# Patient Record
Sex: MALE | Race: WHITE | NOT HISPANIC OR LATINO | Employment: UNEMPLOYED | URBAN - NONMETROPOLITAN AREA
[De-identification: names, ages, dates, MRNs, and addresses within clinical notes are randomized per-mention and may not be internally consistent; named-entity substitution may affect disease eponyms.]

---
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
## 2022-01-25 DIAGNOSIS — F98.8 ATTENTION DEFICIT DISORDER, UNSPECIFIED HYPERACTIVITY PRESENCE: ICD-10-CM

## 2022-01-25 NOTE — TELEPHONE ENCOUNTER
Caller: Daron Le    Relationship: Self    Best call back number: [phone]     Requested Prescriptions:   Requested Prescriptions     Pending Prescriptions Disp Refills   • lisdexamfetamine (Vyvanse) 40 MG capsule 30 capsule 0     Sig: Take 1 capsule by mouth Every Morning        Pharmacy where request should be sent: [facility] - [place] - [address] [facility] - [phone]  - [phone] FX     Additional details provided by patient: OUT OF MEDICATION        Luma Ray Rep   01/25/22 13:55 CST

## 2022-01-25 NOTE — TELEPHONE ENCOUNTER
Rx Refill Note  Requested Prescriptions     Pending Prescriptions Disp Refills   • lisdexamfetamine (Vyvanse) 40 MG capsule 30 capsule 0     Sig: Take 1 capsule by mouth Every Morning      Last office visit with prescribing clinician: 10/18/2021      Next office visit with prescribing clinician: 7/18/2022            Yeni Turner MA  01/25/22, 15:05 CST

## 2022-01-31 ENCOUNTER — TELEMEDICINE (OUTPATIENT)
Dept: FAMILY MEDICINE CLINIC | Facility: CLINIC | Age: 22
End: 2022-01-31

## 2022-01-31 DIAGNOSIS — F98.8 ATTENTION DEFICIT DISORDER, UNSPECIFIED HYPERACTIVITY PRESENCE: ICD-10-CM

## 2022-01-31 PROCEDURE — 99213 OFFICE O/P EST LOW 20 MIN: CPT | Performed by: FAMILY MEDICINE

## 2022-03-15 DIAGNOSIS — F98.8 ATTENTION DEFICIT DISORDER, UNSPECIFIED HYPERACTIVITY PRESENCE: ICD-10-CM

## 2022-03-15 NOTE — TELEPHONE ENCOUNTER
=  Caller: Daron Le    Relationship: Self    Best call back number: [phone] (H)     Requested Prescriptions:   Requested Prescriptions     Pending Prescriptions Disp Refills   • lisdexamfetamine (Vyvanse) 50 MG capsule 30 capsule 0     Sig: Take 1 capsule by mouth Every Morning        Pharmacy where request should be sent: [facility] - [place] - [address] [phone] [facility] [phone] FX     Additional details provided by patient:completely out     Does the patient have less than a 3 day supply:  [x] Yes  [] No    Luma Bonilla Rep   03/15/22 13:49 CDT

## 2022-03-16 NOTE — TELEPHONE ENCOUNTER
Rx Refill Note  Requested Prescriptions     Pending Prescriptions Disp Refills   • lisdexamfetamine (Vyvanse) 50 MG capsule 30 capsule 0     Sig: Take 1 capsule by mouth Every Morning      Last office visit with prescribing clinician: 10/18/2021      Next office visit with prescribing clinician: 7/18/2022   Last Filled 01/31/2022 #30 no rf          {TIP  Is Refill Pharmacy correct?:23}  Dee Dee Tolbert MA  03/16/22, 12:22 CDT

## 2022-05-03 DIAGNOSIS — F98.8 ATTENTION DEFICIT DISORDER, UNSPECIFIED HYPERACTIVITY PRESENCE: ICD-10-CM

## 2022-05-03 NOTE — TELEPHONE ENCOUNTER
Rx Refill Note  Requested Prescriptions     Pending Prescriptions Disp Refills   • lisdexamfetamine (Vyvanse) 50 MG capsule 30 capsule 0     Sig: Take 1 capsule by mouth Every Morning      Last office visit with prescribing clinician: 10/18/2021      Next office visit with prescribing clinician: 7/18/2022    Last refill: 3/17/2022    Danika Arguello MA  05/03/22, 14:34 CDT

## 2022-05-03 NOTE — TELEPHONE ENCOUNTER
Caller: Daron Le    Relationship: Self    Best call back number: [phone]    Requested Prescriptions:   Requested Prescriptions     Pending Prescriptions Disp Refills   • lisdexamfetamine (Vyvanse) 50 MG capsule 30 capsule 0     Sig: Take 1 capsule by mouth Every Morning        Pharmacy where request should be sent: [facility] - [place] - [address] [facility] - [phone]  - [phone] FX         Does the patient have less than a 3 day supply:  [x] Yes  [] No    Luma HERNANDEZ Rep   05/03/22 14:13 CDT

## 2022-07-06 DIAGNOSIS — F98.8 ATTENTION DEFICIT DISORDER, UNSPECIFIED HYPERACTIVITY PRESENCE: ICD-10-CM

## 2022-07-06 NOTE — TELEPHONE ENCOUNTER
Caller: Daron Le    Relationship: Self    Best call back number: [phone]  Requested Prescriptions:   Requested Prescriptions     Pending Prescriptions Disp Refills   • lisdexamfetamine (Vyvanse) 50 MG capsule 30 capsule 0     Sig: Take 1 capsule by mouth Every Morning        Pharmacy where request should be sent: [facility] #[id number] - [address] AT [facility] [address] - [phone] [facility] [phone] FX         Does the patient have less than a 3 day supply:  [x] Yes  [] No    Luma Interiano Rep   07/06/22 11:21 CDT

## 2022-07-06 NOTE — TELEPHONE ENCOUNTER
Rx Refill Note  Requested Prescriptions     Pending Prescriptions Disp Refills   • lisdexamfetamine (Vyvanse) 50 MG capsule 30 capsule 0     Sig: Take 1 capsule by mouth Every Morning      Last office visit with prescribing clinician: 10/18/2021      Next office visit with prescribing clinician: 7/28/2022            Yeni Turner MA  07/06/22, 11:40 CDT

## 2022-07-11 NOTE — TELEPHONE ENCOUNTER
I called and spoke to the pt. He states that the office never called to schedule him for an appointment in April. Pt states that he has already waited 2 weeks and is now going to have to wait longer due to his appointment being 07/28/2022. He would like for the Vyvanse to be called in now and he will still come to his scheduled appointment.

## 2022-07-12 ENCOUNTER — TELEMEDICINE (OUTPATIENT)
Dept: FAMILY MEDICINE CLINIC | Facility: CLINIC | Age: 22
End: 2022-07-12

## 2022-07-12 DIAGNOSIS — F90.9 ADULT ADHD: Primary | ICD-10-CM

## 2022-07-12 DIAGNOSIS — G47.00 INSOMNIA, UNSPECIFIED TYPE: ICD-10-CM

## 2022-07-12 PROCEDURE — 99214 OFFICE O/P EST MOD 30 MIN: CPT | Performed by: FAMILY MEDICINE

## 2022-07-12 RX ORDER — HYDROXYZINE PAMOATE 25 MG/1
25 CAPSULE ORAL NIGHTLY PRN
Qty: 30 CAPSULE | Refills: 0 | Status: SHIPPED | OUTPATIENT
Start: 2022-07-12 | End: 2022-09-27 | Stop reason: SDUPTHER

## 2022-07-12 RX ORDER — DEXTROAMPHETAMINE SACCHARATE, AMPHETAMINE ASPARTATE MONOHYDRATE, DEXTROAMPHETAMINE SULFATE AND AMPHETAMINE SULFATE 3.75; 3.75; 3.75; 3.75 MG/1; MG/1; MG/1; MG/1
15 CAPSULE, EXTENDED RELEASE ORAL EVERY MORNING
Qty: 30 CAPSULE | Refills: 0 | Status: SHIPPED | OUTPATIENT
Start: 2022-07-12 | End: 2022-08-24 | Stop reason: SDUPTHER

## 2022-07-12 NOTE — TELEPHONE ENCOUNTER
Called and notified pt that he needed a follow up appointment every 3 months. I informed the pt we could schedule his appointment for a closer date and he could also do a video visit if it was easier for him. Pt voiced an understanding, transferred him to the front to make an appointment.

## 2022-07-12 NOTE — PROGRESS NOTES
Subjective cc: ADHD   Daron Le is a 21 y.o. male who presents for follow up on ADHD medication via mychart visit.     Patient is currently taking Vyvanse 50 MG daily. He will take it daily for work and school. He does feel like it is helpful. He does notice a tic at times. He does feel like the dose is good.     He has never tried any other ADHD medication but is interested.     Pt does report that he has issues sleeping at times. He has held his ADHD medications and still has sleep issues. He has tried melatonin without improvement. He has trouble falling asleep and staying asleep. Most of it is falling asleep.     History of Present Illness     The following portions of the patient's history were reviewed and updated as appropriate: allergies, current medications, past family history, past medical history, past social history, past surgical history and problem list.        Review of Systems    Objective   There were no vitals taken for this visit.  Physical Exam  Constitutional:       General: He is not in acute distress.     Appearance: Normal appearance. He is not toxic-appearing.   Neurological:      Mental Status: He is alert and oriented to person, place, and time.   Psychiatric:         Mood and Affect: Mood normal.         Behavior: Behavior normal.         Thought Content: Thought content normal.         Judgment: Judgment normal.         Assessment & Plan   Problems Addressed this Visit        Mental Health    Adult ADHD - Primary    Relevant Medications    amphetamine-dextroamphetamine XR (Adderall XR) 15 MG 24 hr capsule    hydrOXYzine pamoate (Vistaril) 25 MG capsule      Other Visit Diagnoses     Insomnia, unspecified type        Relevant Medications    hydrOXYzine pamoate (Vistaril) 25 MG capsule      Diagnoses       Codes Comments    Adult ADHD    -  Primary ICD-10-CM: F90.9  ICD-9-CM: 314.01     Insomnia, unspecified type     ICD-10-CM: G47.00  ICD-9-CM: 780.52         PLAN:     #1 ADHD: chronic,  uncontrolled, will trial alternative medication, advised on risks/benefits of medication, return if not improvign after 1 month, aicha reviewed, controlled contract in chart, UDS UTD, call with any concerns     #2 insomnia: new, will trial vistaril, advised on risks/beenfits, return if not improving     20 minutes           This document has been electronically signed by Cortney Andres MD on July 18, 2022 16:52 CDT

## 2022-08-24 DIAGNOSIS — F90.9 ADULT ADHD: ICD-10-CM

## 2022-08-24 RX ORDER — DEXTROAMPHETAMINE SACCHARATE, AMPHETAMINE ASPARTATE MONOHYDRATE, DEXTROAMPHETAMINE SULFATE AND AMPHETAMINE SULFATE 3.75; 3.75; 3.75; 3.75 MG/1; MG/1; MG/1; MG/1
15 CAPSULE, EXTENDED RELEASE ORAL EVERY MORNING
Qty: 30 CAPSULE | Refills: 0 | Status: SHIPPED | OUTPATIENT
Start: 2022-08-24 | End: 2022-09-27 | Stop reason: SDUPTHER

## 2022-08-24 NOTE — TELEPHONE ENCOUNTER
Caller: Rey Daron    Relationship: Self    Best call back number: [phone]    Requested Prescriptions: amphetamine-dextroamphetamine XR (Adderall XR) 15 MG 24 hr capsule PATIENT IS REQUESTING A MG INCREASE  Requested Prescriptions      No prescriptions requested or ordered in this encounter        Pharmacy where request should be sent:     [facility] #[id number] - [address] AT [facility] [address] - [phone]  - [phone]   [phone]     Additional details provided by patient: TOTALLY OUT    Does the patient have less than a 3 day supply:  [x] Yes  [] No    Luma Grove Rep   08/24/22 10:44 CDT

## 2022-08-24 NOTE — TELEPHONE ENCOUNTER
Rx Refill Note  Requested Prescriptions     Pending Prescriptions Disp Refills   • amphetamine-dextroamphetamine XR (Adderall XR) 15 MG 24 hr capsule 30 capsule 0     Sig: Take 1 capsule by mouth Every Morning      Last office visit with prescribing clinician: 10/18/2021      Next office visit with prescribing clinician: Visit date not found            Candida Parikh LPN  08/24/22, 16:08 CDT

## 2022-09-27 DIAGNOSIS — G47.00 INSOMNIA, UNSPECIFIED TYPE: ICD-10-CM

## 2022-09-27 DIAGNOSIS — F90.9 ADULT ADHD: ICD-10-CM

## 2022-09-27 NOTE — TELEPHONE ENCOUNTER
Caller: Daron Le    Relationship: Self    Best call back number: [phone]      Requested Prescriptions     Pending Prescriptions Disp Refills   • amphetamine-dextroamphetamine XR (Adderall XR) 15 MG 24 hr capsule 30 capsule 0     Sig: Take 1 capsule by mouth Every Morning   • hydrOXYzine pamoate (Vistaril) 25 MG capsule 30 capsule 0     Sig: Take 1 capsule by mouth At Night As Needed (insomnia).        Pharmacy where request should be sent: [facility] #[id number] [address] AT [facility] [address] - [phone] [facility] [phone] FX     Additional details provided by patient:    PATIENT IS ASKING THAT HIS ADDERALL BE INCREASED    Does the patient have less than a 3 day supply:  [x] Yes  [] No    Luma Watkins Rep   09/27/22 15:42 CDT

## 2022-09-28 NOTE — TELEPHONE ENCOUNTER
Rx Refill Note  Requested Prescriptions     Pending Prescriptions Disp Refills   • amphetamine-dextroamphetamine XR (Adderall XR) 15 MG 24 hr capsule 30 capsule 0     Sig: Take 1 capsule by mouth Every Morning   • hydrOXYzine pamoate (Vistaril) 25 MG capsule 30 capsule 0     Sig: Take 1 capsule by mouth At Night As Needed (insomnia).      Last office visit with prescribing clinician: 07/12/2022  Next office visit with prescribing clinician: Visit date not found            Yeni Turner MA  09/28/22, 12:09 CDT

## 2022-09-29 RX ORDER — DEXTROAMPHETAMINE SACCHARATE, AMPHETAMINE ASPARTATE MONOHYDRATE, DEXTROAMPHETAMINE SULFATE AND AMPHETAMINE SULFATE 3.75; 3.75; 3.75; 3.75 MG/1; MG/1; MG/1; MG/1
15 CAPSULE, EXTENDED RELEASE ORAL EVERY MORNING
Qty: 30 CAPSULE | Refills: 0 | Status: SHIPPED | OUTPATIENT
Start: 2022-09-29

## 2022-09-29 RX ORDER — HYDROXYZINE PAMOATE 25 MG/1
25 CAPSULE ORAL NIGHTLY PRN
Qty: 30 CAPSULE | Refills: 0 | Status: SHIPPED | OUTPATIENT
Start: 2022-09-29

## 2024-02-08 ENCOUNTER — OFFICE VISIT (OUTPATIENT)
Dept: FAMILY MEDICINE CLINIC | Facility: CLINIC | Age: 24
End: 2024-02-08
Payer: COMMERCIAL

## 2024-02-08 VITALS
RESPIRATION RATE: 18 BRPM | TEMPERATURE: 98.7 F | HEART RATE: 72 BPM | BODY MASS INDEX: 30.71 KG/M2 | WEIGHT: 247 LBS | SYSTOLIC BLOOD PRESSURE: 131 MMHG | HEIGHT: 75 IN | OXYGEN SATURATION: 98 % | DIASTOLIC BLOOD PRESSURE: 80 MMHG

## 2024-02-08 DIAGNOSIS — F98.8 ADD (ATTENTION DEFICIT DISORDER) WITHOUT HYPERACTIVITY: Primary | ICD-10-CM

## 2024-02-08 PROCEDURE — 99213 OFFICE O/P EST LOW 20 MIN: CPT

## 2024-02-08 RX ORDER — ATOMOXETINE 40 MG/1
40 CAPSULE ORAL DAILY
Qty: 7 CAPSULE | Refills: 0 | Status: SHIPPED | OUTPATIENT
Start: 2024-02-08 | End: 2024-02-15

## 2024-02-08 RX ORDER — ATOMOXETINE 80 MG/1
80 CAPSULE ORAL DAILY
Qty: 30 CAPSULE | Refills: 0 | Status: SHIPPED | OUTPATIENT
Start: 2024-02-15

## 2024-02-08 NOTE — PROGRESS NOTES
Answers submitted by the patient for this visit:  Office Visit on 2024  3:00 PM with Jon Mann (Submitted on 2024)  Please describe your symptoms.: Just getting prescription  Have you had these symptoms before?: No  How long have you been having these symptoms?: 1-4 days  Please list any medications you are currently taking for this condition.: Vyvanse  Please describe any probable cause for these symptoms. : Na    Chief Complaint  Med Refill (Wants to start back on Vyvanse for school)    Subjective        Daron Le presents to [facility]  History of Present Illness  Patient is a 23-year-old male presenting with history of ADD. In the past he had taken Vyvanse with relief. Reports he is a senior in college and is having difficulties with school. With sitting in class he catches his mind wondering. This is also occurring with reading where he will be reading but not remember anything with his mind thinking of other things. Tries to refocus but is not able to stay focused on his work.     Past Medical History:   Diagnosis Date    ADHD (attention deficit hyperactivity disorder)      History reviewed. No pertinent surgical history.  Social History     Socioeconomic History    Marital status: Single   Tobacco Use    Smoking status: Former     Packs/day: 0.25     Years: 2.00     Additional pack years: 0.00     Total pack years: 0.50     Types: Cigarettes     Quit date:      Years since quittin.1     Passive exposure: Past    Smokeless tobacco: Current   Vaping Use    Vaping Use: Every day   Substance and Sexual Activity    Alcohol use: Yes    Sexual activity: Defer     History reviewed. No pertinent family history.    Review of Systems  Review of systems is negative unless otherwise specified in HPI.    Objective   Vital Signs:  /80 (BP Location: Left arm, Patient Position: Sitting, Cuff Size: Adult)   Pulse 72   Temp 98.7 °F (37.1 °C) (Infrared)   Resp  "18   Ht 190.5 cm (75\")   Wt 112 kg (247 lb)   SpO2 98%   BMI 30.87 kg/m²   Estimated body mass index is 30.87 kg/m² as calculated from the following:    Height as of this encounter: 190.5 cm (75\").    Weight as of this encounter: 112 kg (247 lb).       PHQ-9 Depression Screening  Little interest or pleasure in doing things? 0-->not at all   Feeling down, depressed, or hopeless? 0-->not at all   Trouble falling or staying asleep, or sleeping too much?     Feeling tired or having little energy?     Poor appetite or overeating?     Feeling bad about yourself - or that you are a failure or have let yourself or your family down?     Trouble concentrating on things, such as reading the newspaper or watching television?     Moving or speaking so slowly that other people could have noticed? Or the opposite - being so fidgety or restless that you have been moving around a lot more than usual?     Thoughts that you would be better off dead, or of hurting yourself in some way?     PHQ-9 Total Score 0   If you checked off any problems, how difficult have these problems made it for you to do your work, take care of things at home, or get along with other people?          Physical Exam  Vitals and nursing note reviewed.   Constitutional:       Appearance: Normal appearance.   HENT:      Head: Normocephalic.      Right Ear: Tympanic membrane normal.      Nose: Nose normal.      Mouth/Throat:      Mouth: Mucous membranes are moist.      Pharynx: Oropharynx is clear.   Eyes:      Pupils: Pupils are equal, round, and reactive to light.   Cardiovascular:      Rate and Rhythm: Normal rate.   Pulmonary:      Effort: Pulmonary effort is normal. No respiratory distress.   Abdominal:      Palpations: Abdomen is soft.   Musculoskeletal:         General: Normal range of motion.      Cervical back: Normal range of motion.   Skin:     General: Skin is warm and dry.      Capillary Refill: Capillary refill takes less than 2 seconds. "   Neurological:      General: No focal deficit present.      Mental Status: He is alert.   Psychiatric:         Mood and Affect: Mood normal.          Result Review :               Assessment and Plan   Diagnoses and all orders for this visit:    1. ADD (attention deficit disorder) without hyperactivity (Primary)  -     atomoxetine (Strattera) 40 MG capsule; Take 1 capsule by mouth Daily for 7 days.  Dispense: 7 capsule; Refill: 0  -     atomoxetine (Strattera) 80 MG capsule; Take 1 capsule by mouth Daily.  Dispense: 30 capsule; Refill: 0      - Discussed not being able to prescribe Vyvanse. Patient is willing to try Strattera and follow up with Dr. Andres.            Follow Up   Return in 15 days (on 2/23/2024) for Recheck with Dr. Andres.  Patient was given instructions and counseling regarding his condition or for health maintenance advice. Please see specific information pulled into the AVS if appropriate.     Signed by:    HILDA Jerry Date: 02/08/24

## 2024-02-23 ENCOUNTER — OFFICE VISIT (OUTPATIENT)
Dept: FAMILY MEDICINE CLINIC | Facility: CLINIC | Age: 24
End: 2024-02-23
Payer: COMMERCIAL

## 2024-02-23 VITALS
HEART RATE: 62 BPM | OXYGEN SATURATION: 99 % | TEMPERATURE: 99.7 F | BODY MASS INDEX: 30.59 KG/M2 | SYSTOLIC BLOOD PRESSURE: 133 MMHG | HEIGHT: 75 IN | RESPIRATION RATE: 18 BRPM | WEIGHT: 246 LBS | DIASTOLIC BLOOD PRESSURE: 76 MMHG

## 2024-02-23 DIAGNOSIS — F90.9 ADULT ADHD: Primary | ICD-10-CM

## 2024-02-23 PROCEDURE — 99213 OFFICE O/P EST LOW 20 MIN: CPT | Performed by: FAMILY MEDICINE

## 2024-02-23 RX ORDER — LISDEXAMFETAMINE DIMESYLATE CAPSULES 40 MG/1
40 CAPSULE ORAL EVERY MORNING
Qty: 30 CAPSULE | Refills: 0 | Status: SHIPPED | OUTPATIENT
Start: 2024-02-23

## 2024-02-23 NOTE — PROGRESS NOTES
"Subjective cc: ADHD   Daron Le is a 23 y.o. male who presents for ADHD evaluation.     History of Present Illness     ADHD  He was given Strattera for 1 week. The patient took it one morning and went to work, but an hour later he felt sick and started vomiting. He did not eat anything that morning. The patient has used Vyvanse in the past. He tried Adderall XR once or twice, but he did not see any difference. The patient prefers Vyvanse since it was cheaper. He has been off any of the stimulants for 1.5 years. His ADHD is impacting his grades in his senior year. The patient is dozing off from reading an assignment. He is currently working. The patient can tell a difference at work when he did take it, but it was not much as a focus.       He sometimes uses THC vapes.      A review of systems was performed and positive findings are noted in the HPI.      The following portions of the patient's history were reviewed and updated as appropriate: allergies, current medications, past family history, past medical history, past social history, past surgical history, and problem list.        Review of Systems    Objective   Blood pressure 133/76, pulse 62, temperature 99.7 °F (37.6 °C), temperature source Infrared, resp. rate 18, height 190.5 cm (75\"), weight 112 kg (246 lb), SpO2 99%.  Physical Exam  Vitals and nursing note reviewed.   Constitutional:       General: He is not in acute distress.     Appearance: He is well-developed. He is not diaphoretic.   HENT:      Head: Normocephalic and atraumatic.      Right Ear: External ear normal.      Left Ear: External ear normal.      Nose: Nose normal.   Eyes:      General:         Right eye: No discharge.         Left eye: No discharge.      Conjunctiva/sclera: Conjunctivae normal.   Neck:      Thyroid: No thyromegaly.      Trachea: No tracheal deviation.   Cardiovascular:      Rate and Rhythm: Normal rate and regular rhythm.      Heart sounds: Normal heart sounds. "   Pulmonary:      Effort: Pulmonary effort is normal. No respiratory distress.      Breath sounds: Normal breath sounds. No stridor. No wheezing.   Chest:      Chest wall: No tenderness.   Musculoskeletal:         General: Normal range of motion.      Cervical back: Normal range of motion.   Lymphadenopathy:      Cervical: No cervical adenopathy.   Skin:     General: Skin is warm and dry.   Neurological:      Mental Status: He is alert and oriented to person, place, and time.      Motor: No abnormal muscle tone.      Coordination: Coordination normal.   Psychiatric:         Behavior: Behavior normal.         Thought Content: Thought content normal.         Judgment: Judgment normal.         BMI is >= 30 and <35. (Class 1 Obesity). The following options were offered after discussion;: exercise counseling/recommendations and nutrition counseling/recommendations       Assessment & Plan   Problems Addressed this Visit          Mental Health    Adult ADHD - Primary    Relevant Medications    lisdexamfetamine (Vyvanse) 40 MG capsule    Other Relevant Orders    ToxASSURE Select 13 (MW) - Urine, Clean Catch     Diagnoses         Codes Comments    Adult ADHD    -  Primary ICD-10-CM: F90.9  ICD-9-CM: 314.01           1. Adult ADHD, chronic, uncontrolled.  The patient was previously on Vyvanse and did well; however, patient had been discontinued. He is interested in restarting this as he is seeing issues with his grades at school.   We will restart Vyvanse at 40 mg daily.   Advised patient on controlled medication. Controlled contract to be obtained while in office today.   Urine drug screen to be obtained. Did discuss urine drug screen with patient.   He reports that he has used THC vapes. Advised patient that we will still do the urine drug screen today and if it is positive, we will need to repeat another urine drug screen randomly and make sure that it is negative. If patient continues to have abnormal drug screen, we will  no longer write medication. Patient verbalizes understanding.   MARLENA to be reviewed.    Follow-up:   He will return in 1 month for follow-up.          Transcribed from ambient dictation for Cortney Andres MD by Elba Rey.  02/23/24   16:01 CST    Patient or patient representative verbalized consent to the visit recording.  I have personally performed the services described in this document as transcribed by the above individual, and it is both accurate and complete.          This document has been electronically signed by Cortney Andres MD on March 6, 2024 12:19 CST

## 2024-02-27 ENCOUNTER — TELEPHONE (OUTPATIENT)
Dept: FAMILY MEDICINE CLINIC | Facility: CLINIC | Age: 24
End: 2024-02-27
Payer: COMMERCIAL

## 2024-02-27 NOTE — TELEPHONE ENCOUNTER
Caller: Daron Le    Relationship: Self    Best call back number: [phone]     Which medication are you concerned about: VYVANSE 40 MG     Who prescribed you this medication: KEN    When did you start taking this medication: HASN'T STARTED YET DUE TO COST    What are your concerns: WAS TOLD AT PHARMACY IT WAS $300 WOULD LIKE TO FIND AN ALTERNATIVE IT'S TOO EXPENSIVE

## 2024-02-27 NOTE — TELEPHONE ENCOUNTER
Called pharmacy ([facility] back to inquire on stock- do not have generic, any mgs in stock.  Called pt back to see if he will check with other local pharmacies in [place] to see if the have. Pt will call around then follow up.    For Your Information   · If you are in need of a medication refill please use one of the following options:  1. Call your pharmacy for all medication refills and renewals.   2. [facility]- [email or web address]  3. Call your providers office    · If your provider ordered any imaging for you today. Our pre-scheduling services will be reaching out to you within 2 business days to schedule this. Presched[facility] can be reached by calling [phone]     · If your provider ordered testing today, you will be notified of your test results within 3-5 business days unless specified otherwise. If you have not received your results within 5 business days please call your provider's office.    · You may be receiving a survey.  Please take the time to complete this, as your feedback is very important to us!  We strive to make your experience exceptional and your comments help us with that goal.  We look forward to hearing from you!    · For all future appointments please arrive 15 minutes prior to your scheduled visit.     · Patient Contact Center Business Office: assistance with medical billing & financial inquires [phone]

## 2024-02-27 NOTE — TELEPHONE ENCOUNTER
Spoke with [facility] pharmacy and they said that the $300 copay is for brand name Vyvanse and the generic is on backorder and they are unable to get it and do not know when they will be able to. Patients insurance said that a PA can not be done on the brand name Vyvanse, patient will just have to pay the $300 copay. Spoke with patient and he is unable to pay this amount and is requesting that the medication be switched to something that may be cheaper on him, states he can only do about $30/month.

## 2024-02-27 NOTE — TELEPHONE ENCOUNTER
PATIENT CALLED BACK STATED HIS INSURANCE NEEDS A PRIOR AUTHORIZATION FOR THE GENERIC VERSION ON VYVANSE.   HE PROVIDED A NUMBER FOR HIS INSURANCE TO DO THE PA THAT NUMBER [phone]

## 2024-02-27 NOTE — TELEPHONE ENCOUNTER
PT called and stated he wanted the option Jon Dillon mentioned to him in his first visit which was to send in 80MG of Strattera instead since he is having so much trouble getting Vyvanse.      Please advise

## 2024-03-01 DIAGNOSIS — F90.9 ADULT ADHD: Primary | ICD-10-CM

## 2024-03-01 RX ORDER — ATOMOXETINE 80 MG/1
80 CAPSULE ORAL DAILY
Qty: 30 CAPSULE | Refills: 0 | Status: SHIPPED | OUTPATIENT
Start: 2024-03-08

## 2024-03-01 RX ORDER — ATOMOXETINE 40 MG/1
40 CAPSULE ORAL DAILY
Qty: 7 CAPSULE | Refills: 0 | Status: SHIPPED | OUTPATIENT
Start: 2024-03-01

## 2024-03-01 RX ORDER — ATOMOXETINE 40 MG/1
40 CAPSULE ORAL DAILY
Qty: 7 CAPSULE | Refills: 0 | Status: SHIPPED | OUTPATIENT
Start: 2024-03-01 | End: 2024-03-08

## 2024-03-01 NOTE — TELEPHONE ENCOUNTER
Spoke with pharmacy and medication has been ready for pickup since 02/27/2024. Patient copay is 10.00 for Vyvanse.Notified paitent of copay. Patient is wanting to swap to the Strattera. Patient reports that he wants Strattera instead. Patient notified that he needs to start 40mg tablets first then move up to 80mg.Patient notified.

## 2024-03-04 LAB — DRUGS UR: NORMAL

## 2024-04-24 ENCOUNTER — TELEPHONE (OUTPATIENT)
Dept: FAMILY MEDICINE CLINIC | Facility: CLINIC | Age: 24
End: 2024-04-24
Payer: COMMERCIAL

## 2024-04-24 NOTE — TELEPHONE ENCOUNTER
"Received a call from the patient inquiring about his $35 copay for services performed by HILDA Jerry on 02/08/2024. Patient states that when he scheduled his appointment it was to get back on his RX Vyvanse. He explained all of this to HILDA Jerry and then at the end of their visit he states that Jon then says, \"well, here's the deal. I am unable to prescribe that kind of ADHD medication.\" The patient then states that he said, \"Well, what was the point of this visit?\" Jon then prescribes the patient a non-controlled medication for ADHD and tells him to try it. The patient stated that he did fill it and try it and that it made him very sick. He then was scheduled to see Dr. Andres on 02/23/2024 in order to obtain his RX Vyvanse. The patient was questioning why he needed to pay a copay since he was unable to get what he needed from HILDA Jerry.     I explained that I did understand where he was coming from and I apologized that he was not notified that [facility] were unable to fill controlled ADHD medications. I explained that, even though the medication was not what he originally wanted to be prescribed, that Jon still treated him based off of his symptoms and that we would be unable to waive his copay. I did stress that I would be communicating with the HUB manager as well as my team to make sure we are communicating ADHD requirements with patients scheduled to see [facility]. He was appreciative and had no other questions or concerns.   "